# Patient Record
(demographics unavailable — no encounter records)

---

## 2024-10-24 NOTE — CARDIOLOGY SUMMARY
[de-identified] : Sinus bradycardia, Q-wave lead III, nonspecific ST-T wave changes [de-identified] : - Abnormal ECG -Hypercholesterolemia: 10-year cardiac risk 1% 1/8/2024 -Type 2 diabetes -Adrenal mass/partial adrenalectomy -Obesity: BMI 30 -Family history of premature CAD: Father MI 40s

## 2024-10-24 NOTE — REASON FOR VISIT
[FreeTextEntry1] : Chief complaint: Multiple coronary risk factors including diabetes, cholesterol, family history of premature CAD

## 2024-10-24 NOTE — HISTORY OF PRESENT ILLNESS
[FreeTextEntry1] : The patient is a 33-year-old female with a past medical history remarkable for diabetes, obesity, hypercholesterolemia, and a family history of premature coronary artery disease who presents for evaluation prior to starting IVF. The patient walks intermittently.  She is chest pain and dyspnea free.  Her cholesterol from 1/8/2024 was elevated.  Total cholesterol 239, HDL 60, , triglycerides 265.  The patient's aldosterone was mildly elevated.  She reports that this is being followed  Past medical history: Diabetes, obesity Past surgical history: Partial adrenalectomy, left salpingo-oophorectomy, ovarian cyst Social history: Non-smoker, does not exercise Family history: Father MI 40s

## 2024-10-24 NOTE — DISCUSSION/SUMMARY
[FreeTextEntry1] : Abnormal ECG An echocardiogram was ordered to rule out a cardiomyopathy or valvular abnormality as the etiology of her abnormal ECG  Hypercholesterolemia/obesity/diabetes Acceptable 10-year cardiac risk.  In view of the patient's diabetes and family history of premature coronary artery disease, we discussed diet, exercise, and weight loss.  Medical therapy not initiated at this time Nutritional counseling ordered Recheck fasting lipids after 3 months of behavioral modification  Family history of premature CAD Diet, exercise, weight loss, and nutritional counseling as noted above  Telephone/RTO 3 months [EKG obtained to assist in diagnosis and management of assessed problem(s)] : EKG obtained to assist in diagnosis and management of assessed problem(s)

## 2024-11-26 NOTE — PHYSICAL EXAM
[No Acute Distress] : no acute distress [Normal Sclera/Conjunctiva] : normal sclera/conjunctiva [Normal Outer Ear/Nose] : the outer ears and nose were normal in appearance [No Respiratory Distress] : no respiratory distress  [No Edema] : there was no peripheral edema [Non Tender] : non-tender [No Joint Swelling] : no joint swelling [No Rash] : no rash [No Focal Deficits] : no focal deficits [Normal Affect] : the affect was normal [Normal Insight/Judgement] : insight and judgment were intact

## 2024-11-26 NOTE — PLAN
[FreeTextEntry1] :  Ms. NASH INGRAM is a 32 year old F with past medical history of adrenal mass 6.1 x 5.9 cm R adrenal/kidney mass, DM, with hx of asthma   A/P Asthma exacerbation -patient educated on asthma triggers and proper use of inhalers -avoid allergens  - rx: dulera and advair  albuterol rescue inhaler -asthma profile ordered -refer to pulm if ongoing sympstoms -RTC 1 week for follow-up visit; will need flu shot and allergy testing (if not done) -Pt to go to ED if worsening symptoms inc wheezing, SOB, fevers/chills   # DM and  s/p R adrenalectomy 11/21, f/u with endo, most recent labs were normal including cortisol levels reviewed Complications of DM explained including but not limited to MI/TIA/CVA/PAD/PVD/nephropathy/retinopathy neuropathy/amputation and ultimately death. Recommendations include: 1800kcal ADA diet, Opthalmologic evaluation annually for dilated fundoscopic exam, podiatry follow-up for foot care, Medication compliance, Quarterly labwork for HgA1C, lipid panel. Check BW and urine testing today. Continue current meds.

## 2024-11-26 NOTE — HISTORY OF PRESENT ILLNESS
[FreeTextEntry1] :  Ms. NASH INGRAM is a 32 year old F with past medical history of adrenal mass 6.1 x 5.9 cm R adrenal/kidney mass and is s/p R adrenalectomy 11/21, f/u with endo, most recent labs were normal including cortisol levels, repeat MRI planned called to f/u on abnormal labs inc elevated Hct We discussed the elevated hemoglobin which has been rising up to 16 and trending upward.  I advised the patient to see hematology to further evaluate and she is in agreement with this plan. Her other labs included prediabetes and elevated cholesterol  Pt had a recent miscarriage at 9 weeks, and is trying to get pregnant again.  She works as an OR nurse at [employer]   Pt also with  DM with A1C= 7, she is on metformin 1000 BID and Levemit in the evening, f/u endo.  Repeat A1c levels with lab work today.    Today she presents to the complaining of recent wheezing at night.  She has a history of childhood asthma and asthmatic bronchitis which was managed and she thought she grew out of it however recently she has been wheezing more consistently at night.  In the office today she is stable, speaking in complete sentences and saturating in the high 90s.  She has no wheezing on exam.  We discussed asthma management with albuterol and also additional inhalers if she is wheezing at night.  Asthma profile will be done and if she has persistent symptoms despite inhaler she should see pulmonary.  Reproductive Health: normal Significant PMH: preDM, hx of ovarian dermoid cysts, scalp psoriasis, LBP Surgical Hx: s/p L ovarian cyst/dermoid cyst, s/p removal of L ovary and fallopian tube 2001, s/p R ovary dermoid cyst removal, she had undergone IVF for fertility preservation - 7 eggs frozen Family Hx: mother-HTN; father- DM, CAD Allergies: NKDA Meds: off otezla (dermatology), metformin 1000 and 500 pm  Brief Social History: Work/ RN [employer] OR [employer] Living situation:   Tobacco Use: Never smoked. EtOH/Drug use: Denies, social EtOH

## 2025-01-30 NOTE — DISCUSSION/SUMMARY
[FreeTextEntry1] : Abnormal ECG Normal LV function and the absence of valve disease by echocardiography. Normal variant   Hypercholesterolemia/obesity/diabetes In view of the patient's diabetes and family history of premature coronary artery disease, we discussed diet, exercise, and weight loss.   Medical therapy not initiated at this time Nutritional counseling ordered Recheck fasting lipids after 3 months of behavioral modification  Family history of premature CAD Diet, exercise, weight loss, and nutritional counseling as noted above  IVF No cardiac contraindication  RTO 1 year

## 2025-01-30 NOTE — CARDIOLOGY SUMMARY
[de-identified] : - Abnormal ECG -ECHOCARDIOGRAM: 11/7/2024, EF 60%, trace mitral regurgitation and tricuspid regurgitation no PA systolic -Hypercholesterolemia: 10-year cardiac risk 1% 1/8/2024 -Type 2 diabetes -Adrenal mass/partial adrenalectomy -Obesity: BMI 30 -Family history of premature CAD: Father MI 40s

## 2025-01-30 NOTE — HISTORY OF PRESENT ILLNESS
[FreeTextEntry1] : The patient is a 33-year-old female with a past medical history remarkable for diabetes, obesity, hypercholesterolemia, and a family history of premature coronary artery disease who presented for evaluation prior to starting IVF. The patient walks intermittently.  She is eager to follow a prudent diet and to initiate an exercise program.  She is chest pain and dyspnea free.  Her cholesterol from 1/24/2025 was elevated.  Total cholesterol 192, HDL 41, LDL 95, and triglycerides 334.  Since the patient is attempting to become pregnant with IVF we will readdress her elevated triglycerides and low HDL cholesterol at a later date  Echocardiography demonstrated a normal ejection fraction and the absence of valve disease.

## 2025-01-31 NOTE — ASSESSMENT
[FreeTextEntry1] : 33F c history of ?R. adrenal/kidney mass s/p R. adrenalectomy 11/21 follows with endo, type 2 diabetes on insulin w/microalbuminuria, moderate triglyceridemia, obesity, s/p removal of L. oophorectomy & salpingectomy tube 2011 for L. ovarian torsion, s/p R. ovarian cystectomy x 2 here for cpe    [facility] - c heck non fasting bw - defer lipids - will have f/u with cardiology for lipids in 3/25  recently had CMP &a1c with endo -defer ecg performed - ecg nsr utd with optho and dental exam  utd with gyn exam  advised FBSE with derm   states was advisd to get varivax vaccine from gyn because non immune on varicella titers - pt does not have documentation of varicella/chickenpox infection or vaccine (d/w pt risks and benefits) - would need to wait 1 month after 2nd dose of varivax to conceive utd with other vaccines via work   rtc in 1 year for cpe  or prn

## 2025-01-31 NOTE — PHYSICAL EXAM
[No Acute Distress] : no acute distress [Well Nourished] : well nourished [PERRL] : pupils equal round and reactive to light [Normal Oropharynx] : the oropharynx was normal [Normal TMs] : both tympanic membranes were normal [Normal Nasal Mucosa] : the nasal mucosa was normal [No Lymphadenopathy] : no lymphadenopathy [Supple] : supple [Thyroid Normal, No Nodules] : the thyroid was normal and there were no nodules present [No Accessory Muscle Use] : no accessory muscle use [Clear to Auscultation] : lungs were clear to auscultation bilaterally [Regular Rhythm] : with a regular rhythm [Normal S1, S2] : normal S1 and S2 [No Murmur] : no murmur heard [No Edema] : there was no peripheral edema [Normal Appearance] : normal in appearance [No Masses] : no palpable masses [No Nipple Discharge] : no nipple discharge [No Axillary Lymphadenopathy] : no axillary lymphadenopathy [Normal Supraclavicular Nodes] : no supraclavicular lymphadenopathy [Normal Axillary Nodes] : no axillary lymphadenopathy [Normal Posterior Cervical Nodes] : no posterior cervical lymphadenopathy [Normal Anterior Cervical Nodes] : no anterior cervical lymphadenopathy [Normal Affect] : the affect was normal [Normal Insight/Judgement] : insight and judgment were intact [de-identified] : states had recent exam with podiatry

## 2025-01-31 NOTE — HISTORY OF PRESENT ILLNESS
[FreeTextEntry1] : cpe   establosh care [de-identified] : diet: good exercise: regularly   in interval - saw cardiology  in interval - saw endo